# Patient Record
Sex: MALE
[De-identification: names, ages, dates, MRNs, and addresses within clinical notes are randomized per-mention and may not be internally consistent; named-entity substitution may affect disease eponyms.]

---

## 2018-05-16 ENCOUNTER — HOSPITAL ENCOUNTER (EMERGENCY)
Dept: HOSPITAL 31 - C.ER | Age: 23
Discharge: HOME | End: 2018-05-16
Payer: COMMERCIAL

## 2018-05-16 VITALS
TEMPERATURE: 99 F | HEART RATE: 72 BPM | RESPIRATION RATE: 17 BRPM | OXYGEN SATURATION: 98 % | DIASTOLIC BLOOD PRESSURE: 79 MMHG | SYSTOLIC BLOOD PRESSURE: 132 MMHG

## 2018-05-16 VITALS — BODY MASS INDEX: 25.1 KG/M2

## 2018-05-16 DIAGNOSIS — S93.401A: Primary | ICD-10-CM
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
## 2018-05-16 PROCEDURE — 97116 GAIT TRAINING THERAPY: CPT

## 2018-05-16 PROCEDURE — 99284 EMERGENCY DEPT VISIT MOD MDM: CPT

## 2018-05-16 PROCEDURE — 73610 X-RAY EXAM OF ANKLE: CPT

## 2018-05-16 PROCEDURE — 97161 PT EVAL LOW COMPLEX 20 MIN: CPT

## 2018-05-16 NOTE — C.PDOC
History Of Present Illness


23 y/o male presents to the ER complaining of right lateral ankle pain. Patient 

states that he inverted his right ankle while he was at a rock concert 

yesterday. Patient denies having weakness and numbness.


Time Seen by Provider: 05/16/18 09:56


Chief Complaint (Nursing): Lower Extremity Problem/Injury


History Per: Patient


History/Exam Limitations: no limitations


Onset/Duration Of Symptoms: Days


Current Symptoms Are (Timing): Still Present


Severity: Moderate





Past Medical History


Reviewed: Historical Data, Nursing Documentation, Vital Signs


Vital Signs: 


 Last Vital Signs











Temp  99.0 F   05/16/18 09:59


 


Pulse  72   05/16/18 09:59


 


Resp  17   05/16/18 09:59


 


BP  132/79   05/16/18 09:59


 


Pulse Ox  98   05/16/18 10:53














- Medical History


PMH: No Chronic Diseases


Surgical History: No Surg Hx


Family History: States: No Known Family Hx





- Social History


Hx Tobacco Use: Yes


Hx Alcohol Use: Yes


Hx Substance Use: Yes





- Immunization History


Hx Influenza Vaccination: No


Hx Pneumococcal Vaccination: No





Review Of Systems


Except As Marked, All Systems Reviewed And Found Negative.


Musculoskeletal: Positive for: Other (right ankle pain)





Physical Exam





- Physical Exam


Appears: Non-toxic, No Acute Distress


Skin: Normal Color, Warm, Dry


Head: Atraumatic, Normacephalic


Eye(s): bilateral: Normal Inspection


Nose: Normal


Oral Mucosa: Moist


Neck: Supple


Chest: Symmetrical


Cardiovascular: Rhythm Regular


Respiratory: Normal Breath Sounds, No Rales, No Rhonchi, No Wheezing


Extremity: Tenderness (mild tenderness to lateral aspect of right ankle)


Neurological/Psych: Oriented x3, Normal Speech





ED Course And Treatment


O2 Sat by Pulse Oximetry: 98 (RA)


Pulse Ox Interpretation: Normal





- Other Rad


  ** R ankle


X-Ray: Interpreted by Me (no fx/disoc/STS)


Progress Note: motrin ice pack


Reevaluation Time: 10:53


Reassessment Condition: Improved





Medical Decision Making


Medical Decision Making: 





ankle sprain, no fx





Disposition


Doctor Will See Patient In The: Office


Counseled Patient/Family Regarding: Studies Performed, Diagnosis





- Disposition


Referrals: 


Shaik Sanchez MD [Staff Provider] - 


Disposition: HOME/ ROUTINE


Disposition Time: 10:53


Condition: GOOD


Additional Instructions: 


ice packs 1/2 hour per hour, nothing hot


motrin 400-600 mg every 6 hours as needed





Instructions:  Ankle Sprain


Forms:  [facility] (English)





- Clinical Impression


Clinical Impression: 


 Ankle sprain








- Scribe Statement


The provider has reviewed the documentation as recorded by the Scribe


Ze Kaur


Provider Attestation: 





All medical record entries made by the Scribe were at my direction and 

personally dictated by me. I have reviewed the chart and agree that the record 

accurately reflects my personal performance of the history, physical exam, 

medical decision making, and the department course for this patient. I have 

also personally directed, reviewed, and agree with the discharge instructions 

and disposition.